# Patient Record
Sex: FEMALE | Race: BLACK OR AFRICAN AMERICAN | NOT HISPANIC OR LATINO | URBAN - METROPOLITAN AREA
[De-identification: names, ages, dates, MRNs, and addresses within clinical notes are randomized per-mention and may not be internally consistent; named-entity substitution may affect disease eponyms.]

---

## 2023-12-14 ENCOUNTER — OFFICE VISIT (OUTPATIENT)
Dept: URBAN - METROPOLITAN AREA CLINIC 92 | Facility: CLINIC | Age: 38
End: 2023-12-14

## 2023-12-15 ENCOUNTER — OFFICE VISIT (OUTPATIENT)
Dept: URBAN - METROPOLITAN AREA CLINIC 92 | Facility: CLINIC | Age: 38
End: 2023-12-15
Payer: COMMERCIAL

## 2023-12-15 VITALS
HEART RATE: 110 BPM | HEIGHT: 65 IN | SYSTOLIC BLOOD PRESSURE: 112 MMHG | BODY MASS INDEX: 18.39 KG/M2 | TEMPERATURE: 97.4 F | WEIGHT: 110.4 LBS | DIASTOLIC BLOOD PRESSURE: 78 MMHG

## 2023-12-15 DIAGNOSIS — K92.1 HEMATOCHEZIA: ICD-10-CM

## 2023-12-15 DIAGNOSIS — K51.919 ULCERATIVE COLITIS WITH COMPLICATION, UNSPECIFIED LOCATION: ICD-10-CM

## 2023-12-15 DIAGNOSIS — K21.9 GASTROESOPHAGEAL REFLUX DISEASE, UNSPECIFIED WHETHER ESOPHAGITIS PRESENT: ICD-10-CM

## 2023-12-15 DIAGNOSIS — R10.84 GENERALIZED ABDOMINAL PAIN: ICD-10-CM

## 2023-12-15 DIAGNOSIS — R11.2 NAUSEA AND VOMITING IN ADULT: ICD-10-CM

## 2023-12-15 PROBLEM — 64766004: Status: ACTIVE | Noted: 2023-12-15

## 2023-12-15 PROCEDURE — 99204 OFFICE O/P NEW MOD 45 MIN: CPT

## 2023-12-15 RX ORDER — CYCLOBENZAPRINE HYDROCHLORIDE 10 MG/1
TAKE 1 TABLET BY MOUTH EVERY DAY AS NEEDED FOR SPASMS TABLET, FILM COATED ORAL
Qty: 15 EACH | Refills: 0 | Status: ON HOLD | COMMUNITY

## 2023-12-15 RX ORDER — TIZANIDINE 4 MG/1
TAKE 1 TABLET BY MOUTH AT BEDTIME AS NEEDED FOR SPASMS TABLET ORAL
Qty: 30 EACH | Refills: 0 | Status: ON HOLD | COMMUNITY

## 2023-12-15 RX ORDER — SODIUM, POTASSIUM,MAG SULFATES 17.5-3.13G
177ML SOLUTION, RECONSTITUTED, ORAL ORAL
Qty: 1 KIT | Refills: 0 | OUTPATIENT
Start: 2023-12-15 | End: 2023-12-18

## 2023-12-15 RX ORDER — ACETAMINOPHEN AND CODEINE PHOSPHATE 300; 30 MG/1; MG/1
TAKE 1 TABLET BY MOUTH EVERY DAY AS NEEDED FOR PAIN TABLET ORAL
Qty: 15 EACH | Refills: 0 | Status: ON HOLD | COMMUNITY

## 2023-12-15 RX ORDER — PANTOPRAZOLE SODIUM 40 MG/1
1 TABLET TABLET, DELAYED RELEASE ORAL ONCE A DAY
Qty: 30 | Refills: 3 | OUTPATIENT
Start: 2023-12-17

## 2023-12-15 NOTE — HPI-TODAY'S VISIT:
Patient is a 37 year old female who presents for ulcerative colitis dx in 2012. Patient was previously seen by Dr. Hola Aguila in 2013.  He had started her on Apriso 8 tablets a day,  abdominal pain, diarrhea and bleeding did great. She had ran out of her Apriso and started to have worsening problems with cramps, diarrhea and some bleeding. Often times is awakened during the night to have to have a bowel movement, this happens 3-4 times during the night.  She has not had any fever.  She has not had any preceding melena.  Colonoscopy 7/6/12 with Dr. Hola Aguila revealed inflammation found from rectum to cecum secondary to pancolitis UC,one 14mm inflammatory psuedopolyp in ascending colon, bx revealed active chronic colitis consistent with IBD, neg. for dysplasia, TI bx shows no significant histopathology. Maternal Cousin with Crohn's. Maternal grandfather with possible colon cancer.   Today, patient reports she stopped Apriso years ago and has not had a colonoscopy since 2012. Has been taking aloe elite pills over the years. Current symptoms include diarrhea and fecal incontinence. She has 5 BM during the night. Notes of hematochezia. Denies melena. Notes of vomiting, last episode this morning. Last week had bad palpitations. Reports of diffuse abdominal pain, graded a 7/10 last week was 10/10. These episodes began mid October.   Admits to daily reflux, not on medication for this. Sneezes before reflux and vomiting episodes. Denies dysphagia.  Denies NSAID use. Patient had a car accicent in July and now has left and back pain. Had CT scan at [facility] and MRI in [place]

## 2023-12-17 PROBLEM — 235595009: Status: ACTIVE | Noted: 2023-12-17

## 2023-12-18 ENCOUNTER — ERX REFILL RESPONSE (OUTPATIENT)
Dept: URBAN - METROPOLITAN AREA CLINIC 92 | Facility: CLINIC | Age: 38
End: 2023-12-18

## 2023-12-18 LAB
A/G RATIO: 1.2
ABSOLUTE BASOPHILS: 47
ABSOLUTE EOSINOPHILS: 1193
ABSOLUTE LYMPHOCYTES: 1782
ABSOLUTE MONOCYTES: 817
ABSOLUTE NEUTROPHILS: 2861
ALBUMIN: 3.7
ALKALINE PHOSPHATASE: 83
ALT (SGPT): 8
AST (SGOT): 12
BASOPHILS: 0.7
BILIRUBIN, TOTAL: 0.2
BUN/CREATININE RATIO: (no result)
BUN: 7
C-REACTIVE PROTEIN, QUANT: 13.9
CALCIUM: 8.9
CARBON DIOXIDE, TOTAL: 27
CHLORIDE: 105
CREATININE: 0.63
EGFR: 116
EOSINOPHILS: 17.8
FERRITIN, SERUM: 47
GLOBULIN, TOTAL: 3
GLUCOSE: 91
HEMATOCRIT: 36.9
HEMOGLOBIN: 12.4
IRON BIND.CAP.(TIBC): 252
IRON SATURATION: 12
IRON: 30
LYMPHOCYTES: 26.6
MCH: 30.9
MCHC: 33.6
MCV: 92
MONOCYTES: 12.2
MPV: 9.7
NEUTROPHILS: 42.7
PLATELET COUNT: 398
POTASSIUM: 4.1
PROTEIN, TOTAL: 6.7
RDW: 11.5
RED BLOOD CELL COUNT: 4.01
SED RATE BY MODIFIED: 6
SODIUM: 140
VITAMIN B12: 750
VITAMIN D,25-OH,TOTAL,IA: 11
WHITE BLOOD CELL COUNT: 6.7

## 2023-12-18 RX ORDER — SODIUM, POTASSIUM,MAG SULFATES 17.5-3.13G
177ML SOLUTION, RECONSTITUTED, ORAL ORAL
Qty: 1 KIT | Refills: 0 | OUTPATIENT

## 2023-12-18 RX ORDER — SODIUM, POTASSIUM,MAG SULFATES 17.5-3.13G
354ML SOLUTION, RECONSTITUTED, ORAL ORAL
Qty: 1 KIT | Refills: 0 | OUTPATIENT

## 2023-12-19 ENCOUNTER — TELEPHONE ENCOUNTER (OUTPATIENT)
Dept: URBAN - METROPOLITAN AREA CLINIC 92 | Facility: CLINIC | Age: 38
End: 2023-12-19

## 2023-12-19 ENCOUNTER — OFFICE VISIT (OUTPATIENT)
Dept: URBAN - METROPOLITAN AREA CLINIC 92 | Facility: CLINIC | Age: 38
End: 2023-12-19

## 2023-12-19 RX ORDER — ERGOCALCIFEROL 1.25 MG/1
1 CAPSULE CAPSULE ORAL
Qty: 8 CAPSULE | Refills: 1 | OUTPATIENT
Start: 2023-12-19 | End: 2024-04-17

## 2023-12-30 ENCOUNTER — WEB ENCOUNTER (OUTPATIENT)
Dept: URBAN - METROPOLITAN AREA CLINIC 92 | Facility: CLINIC | Age: 38
End: 2023-12-30

## 2024-01-22 ENCOUNTER — CLAIMS CREATED FROM THE CLAIM WINDOW (OUTPATIENT)
Dept: URBAN - METROPOLITAN AREA SURGERY CENTER 16 | Facility: SURGERY CENTER | Age: 39
End: 2024-01-22
Payer: COMMERCIAL

## 2024-01-22 ENCOUNTER — CLAIMS CREATED FROM THE CLAIM WINDOW (OUTPATIENT)
Dept: URBAN - METROPOLITAN AREA CLINIC 4 | Facility: CLINIC | Age: 39
End: 2024-01-22
Payer: COMMERCIAL

## 2024-01-22 DIAGNOSIS — Q85.9 PEUTZ-JEGHERS SYNDROME: ICD-10-CM

## 2024-01-22 DIAGNOSIS — K63.5 BENIGN COLON POLYP: ICD-10-CM

## 2024-01-22 DIAGNOSIS — K62.89 ACUTE PROCTITIS: ICD-10-CM

## 2024-01-22 DIAGNOSIS — K51.80 CHRONIC PANCOLONIC ULCERATIVE COLITIS: ICD-10-CM

## 2024-01-22 DIAGNOSIS — K51.00 ACUTE ULCERATIVE PANCOLITIS: ICD-10-CM

## 2024-01-22 DIAGNOSIS — K63.89 OTHER SPECIFIED DISEASES OF INTESTINE: ICD-10-CM

## 2024-01-22 PROCEDURE — 45385 COLONOSCOPY W/LESION REMOVAL: CPT | Performed by: INTERNAL MEDICINE

## 2024-01-22 PROCEDURE — 88305 TISSUE EXAM BY PATHOLOGIST: CPT | Performed by: PATHOLOGY

## 2024-01-22 PROCEDURE — 00811 ANES LWR INTST NDSC NOS: CPT | Performed by: ANESTHESIOLOGIST ASSISTANT

## 2024-01-22 PROCEDURE — 45380 COLONOSCOPY AND BIOPSY: CPT | Performed by: INTERNAL MEDICINE

## 2024-01-22 PROCEDURE — 00811 ANES LWR INTST NDSC NOS: CPT | Performed by: ANESTHESIOLOGY

## 2024-01-22 PROCEDURE — G8907 PT DOC NO EVENTS ON DISCHARG: HCPCS | Performed by: INTERNAL MEDICINE

## 2024-02-16 ENCOUNTER — OV EP (OUTPATIENT)
Dept: URBAN - METROPOLITAN AREA CLINIC 92 | Facility: CLINIC | Age: 39
End: 2024-02-16

## 2024-02-16 VITALS — HEIGHT: 65 IN

## 2024-02-16 RX ORDER — TIZANIDINE 4 MG/1
TAKE 1 TABLET BY MOUTH AT BEDTIME AS NEEDED FOR SPASMS TABLET ORAL
Qty: 30 EACH | Refills: 0 | Status: ON HOLD | COMMUNITY

## 2024-02-16 RX ORDER — CYCLOBENZAPRINE HYDROCHLORIDE 10 MG/1
TAKE 1 TABLET BY MOUTH EVERY DAY AS NEEDED FOR SPASMS TABLET, FILM COATED ORAL
Qty: 15 EACH | Refills: 0 | Status: ON HOLD | COMMUNITY

## 2024-02-16 RX ORDER — PANTOPRAZOLE SODIUM 40 MG/1
1 TABLET TABLET, DELAYED RELEASE ORAL ONCE A DAY
Qty: 30 | Refills: 3 | OUTPATIENT

## 2024-02-16 RX ORDER — PANTOPRAZOLE SODIUM 40 MG/1
1 TABLET TABLET, DELAYED RELEASE ORAL ONCE A DAY
Qty: 30 | Refills: 3 | Status: ACTIVE | COMMUNITY
Start: 2023-12-17

## 2024-02-16 RX ORDER — SODIUM, POTASSIUM,MAG SULFATES 17.5-3.13G
354ML SOLUTION, RECONSTITUTED, ORAL ORAL
Qty: 1 KIT | Refills: 0 | Status: ACTIVE | COMMUNITY

## 2024-02-16 RX ORDER — ERGOCALCIFEROL 1.25 MG/1
1 CAPSULE CAPSULE ORAL
Qty: 8 CAPSULE | Refills: 1 | Status: ACTIVE | COMMUNITY
Start: 2023-12-19 | End: 2024-04-17

## 2024-02-16 RX ORDER — ACETAMINOPHEN AND CODEINE PHOSPHATE 300; 30 MG/1; MG/1
TAKE 1 TABLET BY MOUTH EVERY DAY AS NEEDED FOR PAIN TABLET ORAL
Qty: 15 EACH | Refills: 0 | Status: ON HOLD | COMMUNITY

## 2024-02-16 NOTE — HPI-TODAY'S VISIT:
Patient is a 37 year old female who presents for ulcerative colitis dx in 2012. Patient was previously seen by Dr. Hola Aguila in 2013.  He had started her on Apriso 8 tablets a day,  abdominal pain, diarrhea and bleeding did great. She had ran out of her Apriso and started to have worsening problems with cramps, diarrhea and some bleeding. Often times is awakened during the night to have to have a bowel movement, this happens 3-4 times during the night.  She has not had any fever.  She has not had any preceding melena.  Colonoscopy 7/6/12 with Dr. Hola Aguila revealed inflammation found from rectum to cecum secondary to pancolitis UC,one 14mm inflammatory psuedopolyp in ascending colon, bx revealed active chronic colitis consistent with IBD, neg. for dysplasia, TI bx shows no significant histopathology. Maternal Cousin with Crohn's. Maternal grandfather with possible colon cancer. Colonoscopyon 1/22/24 with Dr. Figueroa revealed pancolitis, moderate in severity, one 30mm Peutzjegherspolyp in transverse colon, ascending, transverse, descending, sigmoid, andrectal bx revealed patchy mild chronic inactive colitis consistent withquiescent UC,1 yr repeat  Today, patient reports she stopped Apriso years ago and has not had a colonoscopy since 2012. Has been taking aloe elite pills over the years. Current symptoms include diarrhea and fecal incontinence. She has 5 BM during the night. Notes of hematochezia. Denies melena. Notes of vomiting, last episode this morning. Last week had bad palpitations. Reports of diffuse abdominal pain, graded a 7/10 last week was 10/10. These episodes began mid October.   Admits to daily reflux, not on medication for this. Sneezes before reflux and vomiting episodes. Denies dysphagia.  Denies NSAID use. Patient had a car accicent in July and now has left and back pain. Had CT scan at [facility] and MRI in [place]

## 2024-03-12 ENCOUNTER — OV EP (OUTPATIENT)
Dept: URBAN - METROPOLITAN AREA CLINIC 92 | Facility: CLINIC | Age: 39
End: 2024-03-12

## 2024-03-28 ENCOUNTER — OV EP (OUTPATIENT)
Dept: URBAN - METROPOLITAN AREA CLINIC 92 | Facility: CLINIC | Age: 39
End: 2024-03-28

## 2024-03-28 VITALS
BODY MASS INDEX: 18.99 KG/M2 | TEMPERATURE: 96.7 F | WEIGHT: 114 LBS | HEIGHT: 65 IN | SYSTOLIC BLOOD PRESSURE: 97 MMHG | DIASTOLIC BLOOD PRESSURE: 65 MMHG | HEART RATE: 81 BPM

## 2024-03-28 RX ORDER — ACETAMINOPHEN AND CODEINE PHOSPHATE 300; 30 MG/1; MG/1
TAKE 1 TABLET BY MOUTH EVERY DAY AS NEEDED FOR PAIN TABLET ORAL
Qty: 15 EACH | Refills: 0 | Status: ON HOLD | COMMUNITY

## 2024-03-28 RX ORDER — CYCLOBENZAPRINE HYDROCHLORIDE 10 MG/1
TAKE 1 TABLET BY MOUTH EVERY DAY AS NEEDED FOR SPASMS TABLET, FILM COATED ORAL
Qty: 15 EACH | Refills: 0 | Status: ON HOLD | COMMUNITY

## 2024-03-28 RX ORDER — TIZANIDINE 4 MG/1
TAKE 1 TABLET BY MOUTH AT BEDTIME AS NEEDED FOR SPASMS TABLET ORAL
Qty: 30 EACH | Refills: 0 | Status: ON HOLD | COMMUNITY

## 2024-03-28 RX ORDER — MESALAMINE 1000 MG/1
1 SUPPOSITORY AT BEDTIME SUPPOSITORY RECTAL ONCE A DAY
Qty: 90 SUPPOSITORY | Refills: 3 | OUTPATIENT
Start: 2024-03-28 | End: 2025-03-23

## 2024-03-28 RX ORDER — PANTOPRAZOLE SODIUM 40 MG/1
1 TABLET TABLET, DELAYED RELEASE ORAL ONCE A DAY
Qty: 30 | Refills: 3 | Status: ACTIVE | COMMUNITY

## 2024-03-28 RX ORDER — MESALAMINE 1.2 G/1
4 TABLETS WITH A MEAL TABLET, DELAYED RELEASE ORAL ONCE A DAY
Qty: 360 TABLET | Refills: 3 | OUTPATIENT
Start: 2024-03-28 | End: 2025-03-23

## 2024-03-28 RX ORDER — SODIUM, POTASSIUM,MAG SULFATES 17.5-3.13G
354ML SOLUTION, RECONSTITUTED, ORAL ORAL
Qty: 1 KIT | Refills: 0 | Status: ON HOLD | COMMUNITY

## 2024-03-28 RX ORDER — ERGOCALCIFEROL 1.25 MG/1
1 CAPSULE CAPSULE ORAL
Qty: 8 CAPSULE | Refills: 1 | Status: ON HOLD | COMMUNITY
Start: 2023-12-19 | End: 2024-04-17

## 2024-03-28 NOTE — HPI-TODAY'S VISIT:
Patient is a 37 year old female who presents for ulcerative colitis dx in 2012. Patient was previously seen by Dr. Hola Aguila in 2013.  He had started her on Apriso 8 tablets a day,  abdominal pain, diarrhea and bleeding did great. She had ran out of her Apriso and started to have worsening problems with cramps, diarrhea and some bleeding. Often times is awakened during the night to have to have a bowel movement, this happens 3-4 times during the night.  She has not had any fever.  She has not had any preceding melena.  Colonoscopy 7/6/12 with Dr. Hola Aguila revealed inflammation found from rectum to cecum secondary to pancolitis UC,one 14mm inflammatory psuedopolyp in ascending colon, bx revealed active chronic colitis consistent with IBD, neg. for dysplasia, TI bx shows no significant histopathology. Maternal Cousin with Crohn's. Maternal grandfather with possible colon cancer. Colonoscopyon 1/22/24 with Dr. Figueroa revealed pancolitis, moderate in severity, one 30mm Peutzjeghers polyp in transverse colon, ascending, transverse, descending, sigmoid, and rectal bx revealed patchy mild chronic inactive colitis consistent with quiescent UC,1 yr repeat  Patient reported she stopped Apriso years ago and has not had a colonoscopy since 2012. Has taken aloe elite pills over the years. Current symptoms include diarrhea and fecal incontinence. She has 5 BM during the night. Notes of hematochezia. Denies melena. Notes of vomiting, last episode this morning. Last week had bad palpitations. Reports of diffuse abdominal pain, graded a 7/10 last week was 10/10. These episodes began mid October.   Admits to daily reflux, not on medication for this. Sneezes before reflux and vomiting episodes. Denies dysphagia.  Denies NSAID use. Patient had a car accicent in July and now has left and back pain. Had CT scan at [facility] and MRI in [place].   Caffein irritates stomach. Notes of freqwent lower right abdominal pain, graded 2/10. DEnies N/V/reflux, dysphagia.  Patient notes on Lucas she couldnt use her left soulder. Had ankle and wrist swelling and pain went on . Went to orthopedic, referred to rheumatologist but has not seen them yet. Deies hematochezia or melena. Two drinks on weekend. Denies illicit drugs.

## 2024-03-28 NOTE — PHYSICAL EXAM CARDIOVASCULAR:
Pt has appt scheduled with Dr Steffany Moreland next week. PDMP reviewed; no aberrant behavior identified, prescription authorized.   Routed to MD for approval no edema,  no murmurs,  regular rate and rhythm

## 2024-03-28 NOTE — PHYSICAL EXAM PSYCH:
normal mood with appropriate affect [Dear  ___] : Dear  [unfilled], [Consult Letter:] : I had the pleasure of evaluating your patient, [unfilled]. [Please see my note below.] : Please see my note below. [Consult Closing:] : Thank you very much for allowing me to participate in the care of this patient.  If you have any questions, please do not hesitate to contact me. [Sincerely,] : Sincerely, [DrHarry  ___] : Dr. LOZANO [DrHarry ___] : Dr. LZOANO [FreeTextEntry2] : Ike Pabon MD [FreeTextEntry3] : Aj Aiken MD\par Surgical Oncology\par [facility]\par Office: [phone]\par Cell: [phone]\par

## 2024-06-27 ENCOUNTER — OFFICE VISIT (OUTPATIENT)
Dept: URBAN - METROPOLITAN AREA CLINIC 92 | Facility: CLINIC | Age: 39
End: 2024-06-27